# Patient Record
Sex: FEMALE | Race: WHITE | HISPANIC OR LATINO | Employment: STUDENT | URBAN - METROPOLITAN AREA
[De-identification: names, ages, dates, MRNs, and addresses within clinical notes are randomized per-mention and may not be internally consistent; named-entity substitution may affect disease eponyms.]

---

## 2023-05-19 ENCOUNTER — APPOINTMENT (OUTPATIENT)
Dept: LAB | Facility: LAB | Age: 28
End: 2023-05-19
Payer: MEDICAID

## 2024-05-16 ENCOUNTER — LAB (OUTPATIENT)
Dept: LAB | Facility: LAB | Age: 29
End: 2024-05-16
Payer: MEDICAID

## 2024-05-16 ENCOUNTER — PROCEDURE VISIT (OUTPATIENT)
Dept: OBSTETRICS AND GYNECOLOGY | Facility: CLINIC | Age: 29
End: 2024-05-16
Payer: MEDICAID

## 2024-05-16 VITALS — SYSTOLIC BLOOD PRESSURE: 130 MMHG | HEART RATE: 88 BPM | DIASTOLIC BLOOD PRESSURE: 89 MMHG

## 2024-05-16 DIAGNOSIS — Z11.3 SCREENING EXAMINATION FOR STD (SEXUALLY TRANSMITTED DISEASE): ICD-10-CM

## 2024-05-16 DIAGNOSIS — Z12.4 CERVICAL CANCER SCREENING: ICD-10-CM

## 2024-05-16 DIAGNOSIS — Z01.419 WELL WOMAN EXAM: Primary | ICD-10-CM

## 2024-05-16 LAB
HBV SURFACE AG SERPL QL IA: NONREACTIVE
HCV AB SER QL: NONREACTIVE
HIV 1+2 AB+HIV1 P24 AG SERPL QL IA: NONREACTIVE
TREPONEMA PALLIDUM IGG+IGM AB [PRESENCE] IN SERUM OR PLASMA BY IMMUNOASSAY: NONREACTIVE

## 2024-05-16 PROCEDURE — 87661 TRICHOMONAS VAGINALIS AMPLIF: CPT | Performed by: NURSE PRACTITIONER

## 2024-05-16 PROCEDURE — 87340 HEPATITIS B SURFACE AG IA: CPT

## 2024-05-16 PROCEDURE — 88175 CYTOPATH C/V AUTO FLUID REDO: CPT | Mod: TC,GCY | Performed by: NURSE PRACTITIONER

## 2024-05-16 PROCEDURE — 99385 PREV VISIT NEW AGE 18-39: CPT | Performed by: NURSE PRACTITIONER

## 2024-05-16 PROCEDURE — 86780 TREPONEMA PALLIDUM: CPT

## 2024-05-16 PROCEDURE — 86803 HEPATITIS C AB TEST: CPT

## 2024-05-16 PROCEDURE — 87389 HIV-1 AG W/HIV-1&-2 AB AG IA: CPT

## 2024-05-16 PROCEDURE — 87491 CHLMYD TRACH DNA AMP PROBE: CPT | Performed by: NURSE PRACTITIONER

## 2024-05-16 PROCEDURE — 36415 COLL VENOUS BLD VENIPUNCTURE: CPT

## 2024-05-16 RX ORDER — LEVONORGESTREL 19.5 MG/1
INTRAUTERINE DEVICE INTRAUTERINE
COMMUNITY

## 2024-05-16 RX ORDER — LEVONORGESTREL 19.5 MG/1
INTRAUTERINE DEVICE INTRAUTERINE ONCE
COMMUNITY

## 2024-05-16 ASSESSMENT — PAIN SCALES - GENERAL: PAINLEVEL: 0-NO PAIN

## 2024-05-16 NOTE — PROGRESS NOTES
Subjective   Berkley Renteria is a 29 y.o. female who is here for establishing GYN care. Periods are irregular, lasting 7 days on Kyleena. She had a period one month ago with light bleeding. She usually has sporadic spotting.           GYN History:   - G0   - Current contraception: IUD, Kyleena placed around March 2021, patient had cramping initially with Kyleena which has resolved & she is doing well on the Kyleena.   - Desires STD testing as she has a new partner.   - Last Pap smear: 2/12/21 - NEGATIVE FOR INTRAEPITHELIAL LESION OR MALIGNANCY.   - Family history of uterine or ovarian cancer: no  - Family history of breast cancer: no  - Occasionally, she has itching at her perianal area for which she uses hydrocortisone cream. She thinks she may have a skin tag.      Interval History:   - Work as a resident at .     Patient's last menstrual period was 04/12/2024.       Review of Systems    Objective   /89   Pulse 88   LMP 04/12/2024     General:   alert, appears stated age, and cooperative               Vulva: normal   Vagina: normal mucosa, old blood in the vault   Cervix: Normal, pap was taken   Uterus: normal size, small, mobile    Adnexa: normal adnexa   Physical Exam  Genitourinary:      Genitourinary Comments: Normal breast exam.     Normal abdominal exam.     She has a Kyleena IUD. Strings were visualized. Normal exam.          Assessment/Plan   29 y.o. female being assessed for an annual exam.      Diagnoses:   #1 Annual well woman     Plan:   1. Annual well woman    - Kyleena was placed in 2021, so it is good to 2026.    - Pap was taken today. Gonorrhea, chlamydia, and trich testing were added on to the pap.   - Ordered blood work to test for HIV, syphilis, and hepatitis.   - Patient can use Monistat anti itch cream PRN for perianal itching.        Follow-up in 1 year with CHELE Hill-CNP.      Scribe Attestation:   Gypsy VILLANUEVA, am scribing for virtually, and in the presence of  TERESA Hill on 5/16/24 at 2:45 PM.   I, TERESA Hill, personally performed the services described in this documentation which was scribed virtually and I confirm that it is both accurate and complete.     TERESA Hill

## 2024-05-18 LAB
C TRACH RRNA SPEC QL NAA+PROBE: NEGATIVE
N GONORRHOEA DNA SPEC QL PROBE+SIG AMP: NEGATIVE
T VAGINALIS RRNA SPEC QL NAA+PROBE: NEGATIVE

## 2024-05-20 ENCOUNTER — LAB (OUTPATIENT)
Dept: LAB | Facility: LAB | Age: 29
End: 2024-05-20
Payer: MEDICAID

## 2024-05-20 LAB — COTININE UR QL SCN: NEGATIVE

## 2024-05-28 LAB
CYTOLOGY CMNT CVX/VAG CYTO-IMP: NORMAL
LAB AP CONTRACEPTIVE HISTORY: NORMAL
LAB AP HPV GENOTYPE QUESTION: NO
LAB AP HPV HR: NORMAL
LAB AP PAP ADDITIONAL TESTS: NORMAL
LABORATORY COMMENT REPORT: NORMAL
LMP START DATE: NORMAL
MENSTRUAL HX REPORTED: NORMAL
PATH REPORT.TOTAL CANCER: NORMAL

## 2024-05-30 ENCOUNTER — TELEPHONE (OUTPATIENT)
Dept: OBSTETRICS AND GYNECOLOGY | Facility: CLINIC | Age: 29
End: 2024-05-30
Payer: MEDICAID

## 2024-06-18 ENCOUNTER — APPOINTMENT (OUTPATIENT)
Dept: PRIMARY CARE | Facility: CLINIC | Age: 29
End: 2024-06-18
Payer: MEDICAID

## 2024-10-22 ENCOUNTER — TELEPHONE (OUTPATIENT)
Dept: OPHTHALMOLOGY | Facility: CLINIC | Age: 29
End: 2024-10-22
Payer: COMMERCIAL

## 2024-10-22 NOTE — TELEPHONE ENCOUNTER
Received a referral for Glaucoma Suspect patient needs to schedule with glaucoma provider.     Left a voicemail with my callback info for patient to schedule.     -[place]

## 2025-01-07 ENCOUNTER — APPOINTMENT (OUTPATIENT)
Dept: OPHTHALMOLOGY | Facility: CLINIC | Age: 30
End: 2025-01-07
Payer: COMMERCIAL

## 2025-01-23 ENCOUNTER — APPOINTMENT (OUTPATIENT)
Dept: OPHTHALMOLOGY | Facility: CLINIC | Age: 30
End: 2025-01-23
Payer: COMMERCIAL

## 2025-02-18 ENCOUNTER — APPOINTMENT (OUTPATIENT)
Dept: PRIMARY CARE | Facility: CLINIC | Age: 30
End: 2025-02-18
Payer: COMMERCIAL

## 2025-02-18 VITALS
SYSTOLIC BLOOD PRESSURE: 110 MMHG | WEIGHT: 120 LBS | HEIGHT: 60 IN | BODY MASS INDEX: 23.56 KG/M2 | DIASTOLIC BLOOD PRESSURE: 80 MMHG

## 2025-02-18 DIAGNOSIS — L30.8 OTHER ECZEMA: ICD-10-CM

## 2025-02-18 DIAGNOSIS — H40.053 ELEVATED IOP, BILATERAL: ICD-10-CM

## 2025-02-18 DIAGNOSIS — Z00.00 ROUTINE GENERAL MEDICAL EXAMINATION AT A HEALTH CARE FACILITY: Primary | ICD-10-CM

## 2025-02-18 PROBLEM — K13.0 RASH ON LIPS: Status: RESOLVED | Noted: 2025-02-18 | Resolved: 2025-02-18

## 2025-02-18 PROBLEM — L70.9 ACNE: Status: ACTIVE | Noted: 2025-02-18

## 2025-02-18 PROBLEM — B37.9 YEAST INFECTION: Status: RESOLVED | Noted: 2025-02-18 | Resolved: 2025-02-18

## 2025-02-18 PROBLEM — N76.0 VAGINITIS: Status: RESOLVED | Noted: 2025-02-18 | Resolved: 2025-02-18

## 2025-02-18 PROBLEM — D48.5 NEOPLASM OF UNCERTAIN BEHAVIOR OF SKIN: Status: ACTIVE | Noted: 2023-05-10

## 2025-02-18 PROBLEM — L21.9 SEBORRHEIC DERMATITIS: Status: ACTIVE | Noted: 2025-02-18

## 2025-02-18 PROBLEM — Z97.5 PRESENCE OF INTRAUTERINE CONTRACEPTIVE DEVICE: Status: ACTIVE | Noted: 2025-02-18

## 2025-02-18 PROCEDURE — 1036F TOBACCO NON-USER: CPT | Performed by: INTERNAL MEDICINE

## 2025-02-18 PROCEDURE — 99385 PREV VISIT NEW AGE 18-39: CPT | Performed by: INTERNAL MEDICINE

## 2025-02-18 PROCEDURE — 3008F BODY MASS INDEX DOCD: CPT | Performed by: INTERNAL MEDICINE

## 2025-02-18 ASSESSMENT — PATIENT HEALTH QUESTIONNAIRE - PHQ9
2. FEELING DOWN, DEPRESSED OR HOPELESS: NOT AT ALL
1. LITTLE INTEREST OR PLEASURE IN DOING THINGS: NOT AT ALL
SUM OF ALL RESPONSES TO PHQ9 QUESTIONS 1 AND 2: 0

## 2025-02-18 NOTE — PROGRESS NOTES
Subjective   Patient ID: Berkley Renteria is a 30 y.o. female who presents for Annual Exam.    HPI   Berkley is a 30-year-old female who comes to establish primary care and she is due for an annual wellness exam and lab work.  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  Pap smear done by gynecology in May 2024 was normal.  Patient is currently not on any regular prescription medications.  Patient has history of eczema for which she has been evaluated by dermatology back home in [place] and has been prescribed topical triamcinolone and tacrolimus which she uses as needed.  Patient would like to be referred to dermatology regarding this.  Patient has history of elevated intraocular pressure since childhood and will need to see ophthalmology.  No history of fever, chills, chest pain, shortness of breath, cough, dizziness, palpitations, syncope, abdominal pain, nausea, vomiting, diarrhea, melena, rectal bleeding, dysuria, hematuria, headaches, weakness, numbness, mood or sleep issues.  Patient has an IUD in place and has some spotting but no regular menses.  Review of Systems  As per [facility].  Paternal GF -  of MI in his early 50s  Father - prostate cancer  Objective   /80 (BP Location: Right arm, Patient Position: Sitting, BP Cuff Size: Adult)   Ht 1.524 m (5')   Wt 54.4 kg (120 lb)   BMI 23.44 kg/m²     Physical Exam  General - well developed, well appearing, young female in no acute respiratory distress  Eyes - normal conjunctiva with no pallor or icterus, normal extraocular movements  ENT - normal external auditory canals and tympanic membranes, throat clear with no exudates  Neck - No JVD, thyromegaly or lymphadenopathy  Lungs - no respiratory distress and lungs clear to auscultation bilaterally with no rales or wheezes  Heart - normal S1, S2 with normal heart rate, rhythm and no murmurs   Breasts, pelvic and pap - per gyn  Abdomen -  soft, nontender with no masses or organomegaly  Extremities - no  cyanosis or pedal edema  Neuro - grossly normal neuro exam with no focal neuro deficits  Psych - normal mental status, mood and affect   Skin - no rashes or ulcers  MSK - normal gait with grossly normal ROM of major joints  Assessment/Plan        1.  Annual wellness exam-routine labs will be ordered, Pap/pelvic per gynecology, patient is up-to-date on vaccinations  2.  Eczema-patient will be referred to dermatology for follow-up  3.  History of elevated intraocular pressure since childhood-patient will be referred to ophthalmology  Follow-up in 1 year or sooner if needed.  This note was partially generated using the Dragon voice recognition system. There may be some incorrect words, spelling and punctuation errors that were not corrected prior to committing the note to the patient's medical record.

## 2025-03-04 ENCOUNTER — APPOINTMENT (OUTPATIENT)
Dept: OPHTHALMOLOGY | Facility: CLINIC | Age: 30
End: 2025-03-04
Payer: COMMERCIAL

## 2025-04-01 LAB
ALBUMIN SERPL-MCNC: 4.8 G/DL (ref 3.6–5.1)
ALP SERPL-CCNC: 72 U/L (ref 31–125)
ALT SERPL-CCNC: 11 U/L (ref 6–29)
ANION GAP SERPL CALCULATED.4IONS-SCNC: 7 MMOL/L (CALC) (ref 7–17)
APPEARANCE UR: CLEAR
AST SERPL-CCNC: 16 U/L (ref 10–30)
BILIRUB SERPL-MCNC: 0.3 MG/DL (ref 0.2–1.2)
BILIRUB UR QL STRIP: NEGATIVE
BUN SERPL-MCNC: 13 MG/DL (ref 7–25)
CALCIUM SERPL-MCNC: 9.6 MG/DL (ref 8.6–10.2)
CHLORIDE SERPL-SCNC: 105 MMOL/L (ref 98–110)
CHOLEST SERPL-MCNC: 181 MG/DL
CHOLEST/HDLC SERPL: 3.5 (CALC)
CO2 SERPL-SCNC: 29 MMOL/L (ref 20–32)
COLOR UR: YELLOW
CREAT SERPL-MCNC: 0.67 MG/DL (ref 0.5–0.96)
EGFRCR SERPLBLD CKD-EPI 2021: 121 ML/MIN/1.73M2
ERYTHROCYTE [DISTWIDTH] IN BLOOD BY AUTOMATED COUNT: 11.9 % (ref 11–15)
EST. AVERAGE GLUCOSE BLD GHB EST-MCNC: 105 MG/DL
EST. AVERAGE GLUCOSE BLD GHB EST-SCNC: 5.8 MMOL/L
GLUCOSE SERPL-MCNC: 77 MG/DL (ref 65–99)
GLUCOSE UR QL STRIP: NEGATIVE
HBA1C MFR BLD: 5.3 % OF TOTAL HGB
HCT VFR BLD AUTO: 42 % (ref 35–45)
HDLC SERPL-MCNC: 51 MG/DL
HGB BLD-MCNC: 14.2 G/DL (ref 11.7–15.5)
HGB UR QL STRIP: NEGATIVE
KETONES UR QL STRIP: NEGATIVE
LDLC SERPL CALC-MCNC: 102 MG/DL (CALC)
LEUKOCYTE ESTERASE UR QL STRIP: NEGATIVE
MCH RBC QN AUTO: 30.1 PG (ref 27–33)
MCHC RBC AUTO-ENTMCNC: 33.8 G/DL (ref 32–36)
MCV RBC AUTO: 89.2 FL (ref 80–100)
NITRITE UR QL STRIP: NEGATIVE
NONHDLC SERPL-MCNC: 130 MG/DL (CALC)
PH UR STRIP: 6.5 [PH] (ref 5–8)
PLATELET # BLD AUTO: 342 THOUSAND/UL (ref 140–400)
PMV BLD REES-ECKER: 10 FL (ref 7.5–12.5)
POTASSIUM SERPL-SCNC: 4.6 MMOL/L (ref 3.5–5.3)
PROT SERPL-MCNC: 7.6 G/DL (ref 6.1–8.1)
PROT UR QL STRIP: NEGATIVE
RBC # BLD AUTO: 4.71 MILLION/UL (ref 3.8–5.1)
SODIUM SERPL-SCNC: 141 MMOL/L (ref 135–146)
SP GR UR STRIP: 1.03 (ref 1–1.03)
TRIGL SERPL-MCNC: 164 MG/DL
WBC # BLD AUTO: 5.8 THOUSAND/UL (ref 3.8–10.8)

## 2025-05-15 ENCOUNTER — APPOINTMENT (OUTPATIENT)
Dept: OPHTHALMOLOGY | Facility: CLINIC | Age: 30
End: 2025-05-15
Payer: COMMERCIAL

## 2025-08-13 ENCOUNTER — APPOINTMENT (OUTPATIENT)
Dept: OPHTHALMOLOGY | Facility: CLINIC | Age: 30
End: 2025-08-13
Payer: COMMERCIAL

## 2025-08-13 DIAGNOSIS — H40.1130 PRIMARY OPEN ANGLE GLAUCOMA OF BOTH EYES, UNSPECIFIED GLAUCOMA STAGE: ICD-10-CM

## 2025-08-13 DIAGNOSIS — H40.003 GLAUCOMA SUSPECT OF BOTH EYES: Primary | ICD-10-CM

## 2025-08-13 PROCEDURE — 99203 OFFICE O/P NEW LOW 30 MIN: CPT | Performed by: OPHTHALMOLOGY

## 2025-08-13 PROCEDURE — 76514 ECHO EXAM OF EYE THICKNESS: CPT | Performed by: OPHTHALMOLOGY

## 2025-08-13 PROCEDURE — 92020 GONIOSCOPY: CPT | Performed by: OPHTHALMOLOGY

## 2025-08-13 PROCEDURE — 92083 EXTENDED VISUAL FIELD XM: CPT | Performed by: OPHTHALMOLOGY

## 2025-08-13 PROCEDURE — 92133 CPTRZD OPH DX IMG PST SGM ON: CPT | Performed by: OPHTHALMOLOGY

## 2025-08-13 ASSESSMENT — CUP TO DISC RATIO
OD_RATIO: 0.6
OS_RATIO: 0.6

## 2025-08-13 ASSESSMENT — GONIOSCOPY
OS_SUPERIOR: SS
OD_TEMPORAL: SS
OD_SUPERIOR: SS
OD_NASAL: SS
OD_INFERIOR: SS
OS_TEMPORAL: SS
OS_NASAL: SS
OS_INFERIOR: SS

## 2025-08-13 ASSESSMENT — TONOMETRY
OS_IOP_MMHG: 21
OD_IOP_MMHG: 22
IOP_METHOD: GOLDMANN APPLANATION

## 2025-08-13 ASSESSMENT — SLIT LAMP EXAM - LIDS
COMMENTS: NORMAL
COMMENTS: NORMAL

## 2025-08-13 ASSESSMENT — EXTERNAL EXAM - RIGHT EYE: OD_EXAM: NORMAL

## 2025-08-13 ASSESSMENT — PACHYMETRY
OD_CT(UM): 618
OS_CT(UM): 620

## 2025-08-13 ASSESSMENT — VISUAL ACUITY
OS_SC: 20/25
OD_SC: 20/20
OS_SC+: +3
OD_SC+: -2
METHOD: SNELLEN - LINEAR

## 2025-08-13 ASSESSMENT — EXTERNAL EXAM - LEFT EYE: OS_EXAM: NORMAL

## 2026-05-13 ENCOUNTER — APPOINTMENT (OUTPATIENT)
Dept: OPHTHALMOLOGY | Facility: CLINIC | Age: 31
End: 2026-05-13
Payer: COMMERCIAL